# Patient Record
Sex: MALE | Race: WHITE | Employment: OTHER | URBAN - METROPOLITAN AREA
[De-identification: names, ages, dates, MRNs, and addresses within clinical notes are randomized per-mention and may not be internally consistent; named-entity substitution may affect disease eponyms.]

---

## 2022-08-05 ENCOUNTER — OFFICE VISIT (OUTPATIENT)
Dept: BARIATRICS/WEIGHT MGMT | Age: 47
End: 2022-08-05

## 2022-08-05 VITALS
HEIGHT: 70 IN | BODY MASS INDEX: 36.39 KG/M2 | SYSTOLIC BLOOD PRESSURE: 164 MMHG | DIASTOLIC BLOOD PRESSURE: 107 MMHG | TEMPERATURE: 98.1 F | WEIGHT: 254.2 LBS | HEART RATE: 71 BPM

## 2022-08-05 DIAGNOSIS — E66.09 CLASS 2 OBESITY DUE TO EXCESS CALORIES WITHOUT SERIOUS COMORBIDITY WITH BODY MASS INDEX (BMI) OF 36.0 TO 36.9 IN ADULT: ICD-10-CM

## 2022-08-05 DIAGNOSIS — G89.29 CHRONIC PAIN OF RIGHT KNEE: Primary | ICD-10-CM

## 2022-08-05 DIAGNOSIS — M25.561 CHRONIC PAIN OF RIGHT KNEE: Primary | ICD-10-CM

## 2022-08-05 PROCEDURE — 99202 OFFICE O/P NEW SF 15 MIN: CPT | Performed by: INTERNAL MEDICINE

## 2022-08-05 PROCEDURE — 99205 OFFICE O/P NEW HI 60 MIN: CPT | Performed by: INTERNAL MEDICINE

## 2022-08-05 RX ORDER — MELOXICAM 15 MG/1
15 TABLET ORAL DAILY
COMMUNITY

## 2022-08-05 RX ORDER — SEMAGLUTIDE 0.5 MG/.5ML
INJECTION, SOLUTION SUBCUTANEOUS
Qty: 2 ML | Refills: 0 | Status: SHIPPED | OUTPATIENT
Start: 2022-08-05

## 2022-08-05 RX ORDER — CARVEDILOL 25 MG/1
25 TABLET ORAL 2 TIMES DAILY WITH MEALS
COMMUNITY

## 2022-08-05 RX ORDER — SEMAGLUTIDE 0.25 MG/.5ML
INJECTION, SOLUTION SUBCUTANEOUS
Qty: 2 ML | Refills: 0 | Status: SHIPPED | OUTPATIENT
Start: 2022-08-05

## 2022-08-05 RX ORDER — CLONIDINE HYDROCHLORIDE 0.2 MG/1
0.2 TABLET ORAL 2 TIMES DAILY
COMMUNITY

## 2022-08-05 NOTE — PROGRESS NOTES
the evening. Take with meals. cloNIDine (CATAPRES) 0.2 MG tablet Take 0.2 mg by mouth in the morning and 0.2 mg before bedtime. meloxicam (MOBIC) 15 MG tablet Take 15 mg by mouth in the morning. No current facility-administered medications for this visit. ROS -  Card - no CP  GI - no N/V/D/C    PE -  Gen : BP (!) 145/100 (Site: Left Upper Arm, Position: Sitting, Cuff Size: Large Adult)   Pulse 78   Temp 98.1 °F (36.7 °C) (Temporal)   Ht 5' 9.75\" (1.772 m)   Wt 254 lb 3.2 oz (115.3 kg)   BMI 36.74 kg/m²    Repeat /107    WN, WD, NAD  Heart:  RRR w/o MGR, no Carotid bruits,   Lung: Nml resp effort, CTA b/l  Psych: Normal mood   Full affect  Neuro: Moves all ext well  ______________________    HISTORY & ASSESSMENT/PLAN -     Problem 1 - Knee Pain  HPI -   Right side  Assessment - Uncontrolled; excess wt is increasing the mechanical load across the joint and altering the biomechanics of posture and gait  Plan -   Wt reduction per the plan below    Problem 2 - Obesity   HPI -  See above Background for description  Weight  Date   254.2 lbs 08/05/22  DEN: 2700 david/d = 18,900 david/wk  Wt effect of HR food = Restaurant food 2,100 david/wk + SSB 3,110 = 5,210 david/wk = 744 david/d = 28% DEN = 74 lbs/yr  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 However, he prefers the reduction phase to be as rapid as possible. He therefore chose a VLCD as his diet. Also, he would like to begin [facility]FORT VISHNU as an appetite suppressant. Initial diet plan: a protein-preserving, modified liquid fast  Initial appetite suppressant: Wegovy  Assessment - Uncontrolled  Plan -     Breakfast -     one high protein shake                            + 20 grams of fiber. Do this by either eating 12 tablespoons of the original, plain Fiber One cereal every day or 4 tablespoons of wheat dextrin powder (Benefiber or a generic brand) every day.  Work up to this amount slowly by starting with only one-eighth to one-fourth of the target amount and then adding another one-eighth to one-fourth every one or two weeks until reaching the target. Lunch -           one high protein shake                           + one a fat snack item     Dinner -          one frozen meal                           + one snack item     Shake options (<200 david, >25 grams/protein) :  BeneProtein and GNC lean are lactose-free) options  (Disclaimer: Dietary supplements rarely have their listed ingredients and the amount of each verified by a third party other. Sometimes they give verification for their claims to be GMO and gluten free and to be organic.  However, even such verifications as these may still be untrustworthy.)     Fat snack options (<150 david, >11 grams of fat): 22 almonds or cashews,  1 1/2 tablespoon of a oil-based dressing or 4 tablespoons of [place] dressing on a bed of salad greens, 1 1/2 tablespoons of peanut butter, 1 [place] [employer] options (<100 david, no sweets): fruit, low fat/high protein [place] yogurt, mozzarella cheese stick, nuts, salad with dressing, peanut butter, chips/crackers/pretzels     Frozen meal options (<350 david):  Weight Watchers Smart Ones, [employer] Cuisine, Healthy Choice, Mackenzie's, Socorro's     Food substitutes for the shakes:  4 oz of baked, grilled or broiled chicken, turkey or fish, 10 egg whites     Take a multivitamin daily     Walk 30 min every day    Drink at least 64 oz of water daily    _________________________________    Pipo Cure by taking 0.25 mg once each week for four weeks, then increase to 0.5 mg once each weeks; at the end of four weeks contact me for the next prescription      Follow up   3 months       Total time spent on encounter: 65 min    Lisset Carrasco MD  Endocrinology/Obesity  8/5/22

## 2022-08-05 NOTE — PATIENT INSTRUCTIONS
Breakfast -     one high protein shake                            + 20 grams of fiber. Do this by either eating 12 tablespoons of the original, plain Fiber One cereal every day or 4 tablespoons of wheat dextrin powder (Benefiber or a generic brand) every day. Work up to this amount slowly by starting with only one-eighth to one-fourth of the target amount and then adding another one-eighth to one-fourth every one or two weeks until reaching the target. Lunch -           one high protein shake                           + one a fat snack item     Dinner -          one frozen meal                           + one snack item     Shake options (<200 david, >25 grams/protein) :  BeneProtein and GNC lean are lactose-free) options  (Disclaimer: Dietary supplements rarely have their listed ingredients and the amount of each verified by a third party other. Sometimes they give verification for their claims to be GMO and gluten free and to be organic.  However, even such verifications as these may still be untrustworthy.)     Fat snack options (<150 david, >11 grams of fat): 22 almonds or cashews,  1 1/2 tablespoon of a oil-based dressing or 4 tablespoons of [place] dressing on a bed of salad greens, 1 1/2 tablespoons of peanut butter, 1 Cranberry [place] [facility] options (<100 david, no sweets): fruit, low fat/high protein [place] yogurt, mozzarella cheese stick, nuts, salad with dressing, peanut butter, chips/crackers/pretzels     Frozen meal options (<350 david):  Weight Watchers Smart Ones, [employer] Cuisine, Healthy Choice, Mackenzie's, [place]'s     Food substitutes for the shakes:  4 oz of baked, grilled or broiled chicken, turkey or fish, 10 egg whites     Take a multivitamin daily     Walk 30 min every day    Drink at least 64 oz of water daily    _________________________________    Ashlyn Carpen by taking 0.25 mg once each week for four weeks, then increase to 0.5 mg once each weeks; at the end of four weeks contact me for the next prescription

## 2023-01-06 ENCOUNTER — OFFICE VISIT (OUTPATIENT)
Dept: BARIATRICS/WEIGHT MGMT | Age: 48
End: 2023-01-06

## 2023-01-06 VITALS
TEMPERATURE: 97.6 F | HEART RATE: 59 BPM | SYSTOLIC BLOOD PRESSURE: 163 MMHG | WEIGHT: 250.2 LBS | BODY MASS INDEX: 35.82 KG/M2 | HEIGHT: 70 IN | DIASTOLIC BLOOD PRESSURE: 116 MMHG

## 2023-01-06 DIAGNOSIS — M25.561 CHRONIC PAIN OF RIGHT KNEE: Primary | ICD-10-CM

## 2023-01-06 DIAGNOSIS — E66.09 CLASS 2 OBESITY DUE TO EXCESS CALORIES WITHOUT SERIOUS COMORBIDITY WITH BODY MASS INDEX (BMI) OF 36.0 TO 36.9 IN ADULT: ICD-10-CM

## 2023-01-06 DIAGNOSIS — G89.29 CHRONIC PAIN OF RIGHT KNEE: Primary | ICD-10-CM

## 2023-01-06 PROCEDURE — 99214 OFFICE O/P EST MOD 30 MIN: CPT | Performed by: INTERNAL MEDICINE

## 2023-01-06 RX ORDER — SEMAGLUTIDE 1.34 MG/ML
INJECTION, SOLUTION SUBCUTANEOUS
Qty: 9 ML | Refills: 2 | Status: SHIPPED | OUTPATIENT
Start: 2023-01-06

## 2023-01-06 RX ORDER — SEMAGLUTIDE 1.34 MG/ML
INJECTION, SOLUTION SUBCUTANEOUS
Qty: 3 ML | Refills: 0 | Status: SHIPPED | OUTPATIENT
Start: 2023-01-06

## 2023-01-06 NOTE — PATIENT INSTRUCTIONS
Breakfast -     one high protein shake                            + 20 grams of fiber. Do this by either eating 12 tablespoons of the original, plain Fiber One cereal every day or 4 tablespoons of wheat dextrin powder (Benefiber or a generic brand) every day. Work up to this amount slowly by starting with only one-eighth to one-fourth of the target amount and then adding another one-eighth to one-fourth every one or two weeks until reaching the target. Lunch -           one high protein shake                           + one a fat snack item     Dinner -          one frozen meal                           + one snack item     Shake options (<200 david, >25 grams/protein) :  BeneProtein and GNC lean are lactose-free) options  (Disclaimer: Dietary supplements rarely have their listed ingredients and the amount of each verified by a third party other. Sometimes they give verification for their claims to be GMO and gluten free and to be organic.  However, even such verifications as these may still be untrustworthy.)     Fat snack options (<150 david, >11 grams of fat): 22 almonds or cashews,  1 1/2 tablespoon of a oil-based dressing or 4 tablespoons of [place] dressing on a bed of salad greens, 1 1/2 tablespoons of peanut butter, 1 Cranberry [place] [employer] options (<100 david, no sweets): fruit, low fat/high protein [place] yogurt, mozzarella cheese stick, nuts, salad with dressing, peanut butter, chips/crackers/pretzels     Frozen meal options (<350 david):  Weight Watchers Smart Ones, [employer] Cuisine, Healthy Choice, Mackenzie's, Socorro's     Food substitutes for the shakes:  4 oz of baked, grilled or broiled chicken, turkey or fish, 10 egg whites     Take a multivitamin daily     Walk 30 min every day    Drink at least 64 oz of water daily    _________________________________    Start Ozempic by taking 0.25 mg once each week for four weeks, then increase to 0.5 mg once each weeks, then increase to 1 mg once each week

## 2023-01-06 NOTE — PROGRESS NOTES
CC -   Knee pain, Obesity    BACKGROUND -   Last visit: 08/05/22  First visit: 08/05/22    Obesity   Began in 08/05/22  Initial BMI 36.7, Wt 254.2 lbs, 5' 9.75\"  HS Grad wt 130 lbs   Lowest   wt 130 lbs   Highest  wt 254 lbs  Pattern of wt gain: grad  Wt change past yr: +5 lbs  Most wt lost: none  Other diets attempted: none      Initial Diet:    Number of meals per day - 2    Number of snacks per day - 2    Meal volume - 12\" plate, sometime seconds    Fast food/convenience store - [facility] [facility] (not fast food) - 7x/week   Sweets - 0-1d/week   Chips - 2d/week   Crackers/pretzels - 1d/week (saltines with butter)   Nuts - 2d/week (one handful)   Peanut Butter - 0d/week   Popcorn - 0d/week   Dried fruit - 0d/week   Whole fruit - 5-7d/week (one to two servings)   Breakfast cereal - 0d/week   [place]/Protein/Energy bar - 0d/week   Sugar sweetened beverages - 10 oz sweet tea 3x/wk, 3 glasses wine/d, 12 12 oz craft beers/wk   Protein - No supplements   Fiber - No supplements     Exercise:    Gym membership - [employer] - none    Running - none    Resistance - none    Aerobic class - none    Bicycle  - 5 mi/d, 5 d/wk    ______________________    [facility] -  Past Medical History:   Diagnosis Date    Chronic pain of both shoulders     Chronic pain of right knee     Class 2 obesity due to excess calories without serious comorbidity with body mass index (BMI) of 36.0 to 36.9 in adult     GERD (gastroesophageal reflux disease)     Primary hypertension      Current Outpatient Medications   Medication Sig Dispense Refill    Semaglutide-Weight Management (WEGOVY) 0.25 MG/0.5ML SOAJ SC injection Inject 0.25 mg under the skin once each week for 4 weeks, then increase to 0.5 mg once each week 2 mL 0    Semaglutide-Weight Management (WEGOVY) 0.5 MG/0.5ML SOAJ SC injection Inject 0.5 mg under the skin every 7 days 2 mL 0    carvedilol (COREG) 25 MG tablet Take 25 mg by mouth in the morning and 25 mg in the evening. Take with meals. cloNIDine (CATAPRES) 0.2 MG tablet Take 0.2 mg by mouth in the morning and 0.2 mg before bedtime. meloxicam (MOBIC) 15 MG tablet Take 15 mg by mouth in the morning. No current facility-administered medications for this visit. ROS -  Card - no CP  GI - no N/V/D/C    PE -  Gen : BP (!) 180/115 (Site: Left Upper Arm, Position: Sitting, Cuff Size: Large Adult)   Pulse 65   Temp 97.6 °F (36.4 °C) (Temporal)   Ht 5' 9.75\" (1.772 m)   Wt 250 lb 3.2 oz (113.5 kg)   BMI 36.16 kg/m²    Repeat /116   WN, WD, NAD  Heart:  RRR w/o MGR, no Carotid bruits,   Lung: Nml resp effort, CTA b/l  Psych: Normal mood   Full affect  Neuro: Moves all ext well  ______________________    HISTORY & ASSESSMENT/PLAN -     Problem 1 - Knee Pain  HPI -   Right side   5/10 severity this past week  Assessment - Uncontrolled; excess wt is increasing the mechanical load across the joint and altering the biomechanics of posture and gait  Plan -   Wt reduction per the plan below    Problem 2 - Obesity   HPI -  See above Background for description  Weight  Date   254.2 lbs 08/05/22   250.2 lbs 01/06/23  Total wt change to date: - 4.0 lbs  DEN: 2700 david/d = 18,900 david/wk  Avg daily energy variance:   08/05/22-01/06/23 = - 2.0 lbs/154d = - 45 david/d deficit  Wt effect of HR food = Restaurant food 2,100 david/wk + SSB 3,110 = 5,210 david/wk = 744 david/d = 28% DEN = 74 lbs/yr  Notes from previous visit  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 However, he prefers the reduction phase to be as rapid as possible. He therefore chose a VLCD as his diet. Also, he would like to begin [facility]FORT VISHNU as an appetite suppressant.   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 Do this by either eating 12 tablespoons of the original, plain Fiber One cereal every day or 4 tablespoons of wheat dextrin powder (Benefiber or a generic brand) every day. Work up to this amount slowly by starting with only one-eighth to one-fourth of the target amount and then adding another one-eighth to one-fourth every one or two weeks until reaching the target. Lunch -           one high protein shake                           + one a fat snack item     Dinner -          one frozen meal                           + one snack item     Shake options (<200 david, >25 grams/protein) :  BeneProtein and GNC lean are lactose-free) options  (Disclaimer: Dietary supplements rarely have their listed ingredients and the amount of each verified by a third party other. Sometimes they give verification for their claims to be GMO and gluten free and to be organic.  However, even such verifications as these may still be untrustworthy.)     Fat snack options (<150 david, >11 grams of fat): 22 almonds or cashews,  1 1/2 tablespoon of a oil-based dressing or 4 tablespoons of [place] dressing on a bed of salad greens, 1 1/2 tablespoons of peanut butter, 1 Cranberry [place] [employer] options (<100 david, no sweets): fruit, low fat/high protein [place] yogurt, mozzarella cheese stick, nuts, salad with dressing, peanut butter, chips/crackers/pretzels Frozen meal options (<350 david):  Weight Watchers Smart Ones, [employer] Cuisine, Healthy Choice, Mackenzie's, [place]'s     Food substitutes for the shakes:  4 oz of baked, grilled or broiled chicken, turkey or fish, 10 egg whites     Take a multivitamin daily     Walk 30 min every day    Drink at least 64 oz of water daily    _________________________________    Start Ozempic by taking 0.25 mg once each week for four weeks, then increase to 0.5 mg once each weeks; at the end of four weeks contact me for the next prescription      Follow up   3 months     Medication management: Nel Thomas MD  Endocrinology/Obesity  1/6/23

## 2023-01-23 ENCOUNTER — TELEPHONE (OUTPATIENT)
Dept: BARIATRICS/WEIGHT MGMT | Age: 48
End: 2023-01-23

## 2023-10-27 DIAGNOSIS — E66.09 CLASS 2 OBESITY DUE TO EXCESS CALORIES WITHOUT SERIOUS COMORBIDITY WITH BODY MASS INDEX (BMI) OF 36.0 TO 36.9 IN ADULT: ICD-10-CM

## 2023-11-01 RX ORDER — SEMAGLUTIDE 1.34 MG/ML
INJECTION, SOLUTION SUBCUTANEOUS
Qty: 9 ML | Refills: 3 | Status: SHIPPED | OUTPATIENT
Start: 2023-11-01